# Patient Record
Sex: MALE | Race: OTHER | NOT HISPANIC OR LATINO | URBAN - METROPOLITAN AREA
[De-identification: names, ages, dates, MRNs, and addresses within clinical notes are randomized per-mention and may not be internally consistent; named-entity substitution may affect disease eponyms.]

---

## 2021-08-28 ENCOUNTER — TELEPHONE ENCOUNTER (OUTPATIENT)
Dept: URBAN - METROPOLITAN AREA CLINIC 13 | Facility: CLINIC | Age: 26
End: 2021-08-28

## 2021-08-29 ENCOUNTER — TELEPHONE ENCOUNTER (OUTPATIENT)
Dept: URBAN - METROPOLITAN AREA CLINIC 13 | Facility: CLINIC | Age: 26
End: 2021-08-29

## 2021-10-18 ENCOUNTER — OFFICE VISIT (OUTPATIENT)
Dept: URBAN - NONMETROPOLITAN AREA CLINIC 13 | Facility: CLINIC | Age: 26
End: 2021-10-18

## 2021-11-15 ENCOUNTER — OFFICE VISIT (OUTPATIENT)
Dept: URBAN - METROPOLITAN AREA CLINIC 54 | Facility: CLINIC | Age: 26
End: 2021-11-15
Payer: COMMERCIAL

## 2021-11-15 ENCOUNTER — WEB ENCOUNTER (OUTPATIENT)
Dept: URBAN - METROPOLITAN AREA CLINIC 54 | Facility: CLINIC | Age: 26
End: 2021-11-15

## 2021-11-15 VITALS
TEMPERATURE: 98.1 F | WEIGHT: 188 LBS | BODY MASS INDEX: 30.22 KG/M2 | HEART RATE: 89 BPM | SYSTOLIC BLOOD PRESSURE: 126 MMHG | HEIGHT: 66 IN | DIASTOLIC BLOOD PRESSURE: 82 MMHG

## 2021-11-15 DIAGNOSIS — R10.30 LOWER ABDOMINAL PAIN: ICD-10-CM

## 2021-11-15 DIAGNOSIS — B36.9 FUNGAL RASH OF TORSO: ICD-10-CM

## 2021-11-15 DIAGNOSIS — R19.5 MUCOUS IN STOOLS: ICD-10-CM

## 2021-11-15 PROCEDURE — 99204 OFFICE O/P NEW MOD 45 MIN: CPT | Performed by: INTERNAL MEDICINE

## 2021-11-15 RX ORDER — DICYCLOMINE HYDROCHLORIDE 20 MG/1
TAKE 1 TABLET BY MOUTH THREE TIMES DAILY AS NEEDED FOR 30 DAYS TABLET ORAL THREE TIMES A DAY
Qty: 270 | Refills: 3 | OUTPATIENT
Start: 2021-11-15 | End: 2022-11-10

## 2021-11-15 RX ORDER — KETOCONAZOLE FOAM 20 MG/G
1 APPLICATION AEROSOL, FOAM TOPICAL TWICE A DAY
Qty: 1 BOTTLE | Refills: 0 | OUTPATIENT

## 2021-11-15 NOTE — HPI-TODAY'S VISIT:
Patient reports that he has had rash in the past 2 months. Pt reports that there was a point in the week when it would calm down when it was almost gone. When scratches it it would flare up.  Pt rpeorts that he has had abdominal pain as well with tenderness. Pt reports that the abd pain is not worse with eating. Pt states that he has no change in bowel habits with it. Pt repors that for the past 5-6 months has noted  Had covid in Jan. Pt reports that he has had chest palpitations. Had CT scan which was negative  Pt reports that he is not sure if the abdominal pain is related to the rash. Did have a speck on the stool of whitish discoloration States had scrotal cyst and worried about ca.

## 2022-01-31 ENCOUNTER — OFFICE VISIT (OUTPATIENT)
Dept: URBAN - NONMETROPOLITAN AREA CLINIC 2 | Facility: CLINIC | Age: 27
End: 2022-01-31
Payer: COMMERCIAL

## 2022-01-31 VITALS
BODY MASS INDEX: 31.4 KG/M2 | SYSTOLIC BLOOD PRESSURE: 137 MMHG | HEIGHT: 66 IN | DIASTOLIC BLOOD PRESSURE: 74 MMHG | WEIGHT: 195.4 LBS | TEMPERATURE: 97.3 F | HEART RATE: 80 BPM

## 2022-01-31 DIAGNOSIS — R14.0 BLOATING: ICD-10-CM

## 2022-01-31 DIAGNOSIS — K58.8 OTHER IRRITABLE BOWEL SYNDROME: ICD-10-CM

## 2022-01-31 PROCEDURE — 99214 OFFICE O/P EST MOD 30 MIN: CPT | Performed by: NURSE PRACTITIONER

## 2022-01-31 RX ORDER — DICYCLOMINE HYDROCHLORIDE 20 MG/1
TAKE 1 TABLET BY MOUTH THREE TIMES DAILY AS NEEDED FOR 30 DAYS TABLET ORAL THREE TIMES A DAY
Qty: 270 | Refills: 3 | Status: ACTIVE | COMMUNITY
Start: 2021-11-15 | End: 2022-11-10

## 2022-01-31 RX ORDER — KETOCONAZOLE FOAM 20 MG/G
1 APPLICATION AEROSOL, FOAM TOPICAL TWICE A DAY
Qty: 1 BOTTLE | Refills: 0 | Status: ACTIVE | COMMUNITY

## 2022-01-31 RX ORDER — RIFAXIMIN 550 MG/1
1 TABLET TABLET ORAL THREE TIMES A DAY
Qty: 42 TABLET | Refills: 2 | OUTPATIENT
Start: 2022-01-31 | End: 2022-03-14

## 2022-01-31 NOTE — HPI-TODAY'S VISIT:
Patient reports that he has had rash in the past 2 months. Pt reports that there was a point in the week when it would calm down when it was almost gone. When scratches it it would flare up.  Pt rpeorts that he has had abdominal pain as well with tenderness. Pt reports that the abd pain is not worse with eating. Pt states that he has no change in bowel habits with it. Pt repors that for the past 5-6 months has noted. Had covid in Jan. Pt reports that he has had chest palpitations. Had CT scan which was negative. Pt reports that he is not sure if the abdominal pain is related to the rash. Did have a speck on the stool of whitish discoloration States had scrotal cyst and worried about ca.  1/31/2022 Andi presents for follow up of reflux and bloating.  A copy of this note be sent to the referring physician.  He states for the past year since marv Covid in January 2021 he has had postprandial bloating belching and bowel regularity.  He states that he has a soft bowel movement daily but has excessive gas.  He does have mucus in his stool at times.  He was unable to do the stool studies as ordered by Dr. Flood due to his work schedule with working nights at [employer].  He states he does have early satiety after most meals.  He denies any rectal bleeding or nocturnal symptoms.  He has had a contrasted CT scan done by his primary care physician, we do not have a copy of this.  Today his main complaint is abdominal pain and gas trapping.  MB

## 2022-01-31 NOTE — PHYSICAL EXAM EYES:
Conjuntivae and eyelids appear normal , Sclerae : White without injection Please fax x-ray orders to central scheduling

## 2022-02-02 LAB
A/G RATIO: 1.8
ALBUMIN: 4.6
ALKALINE PHOSPHATASE: 102
ALT (SGPT): 8
AST (SGOT): 13
BASO (ABSOLUTE): 0.1
BASOS: 1
BILIRUBIN, TOTAL: <0.2
BUN/CREATININE RATIO: 19
BUN: 18
C-REACTIVE PROTEIN, QUANT: 3
CALCIUM: 9.3
CARBON DIOXIDE, TOTAL: 17
CHLORIDE: 106
CREATININE: 0.94
DEAMIDATED GLIADIN ABS, IGA: 3
DEAMIDATED GLIADIN ABS, IGG: 6
EGFR IF AFRICN AM: 127
EGFR IF NONAFRICN AM: 110
ENDOMYSIAL ANTIBODY IGA: NEGATIVE
EOS (ABSOLUTE): 0.1
EOS: 2
GLOBULIN, TOTAL: 2.5
GLUCOSE: 91
H PYLORI, IGM ABS: <9
H. PYLORI, IGA ABS: <9
HEMATOCRIT: 48.9
HEMATOLOGY COMMENTS:: (no result)
HEMOGLOBIN: 16
IMMATURE CELLS: (no result)
IMMATURE GRANS (ABS): 0.1
IMMATURE GRANULOCYTES: 1
IMMUNOGLOBULIN A, QN, SERUM: 133
LYMPHS (ABSOLUTE): 2.3
LYMPHS: 30
MCH: 29.3
MCHC: 32.7
MCV: 90
MONOCYTES(ABSOLUTE): 0.7
MONOCYTES: 9
NEUTROPHILS (ABSOLUTE): 4.4
NEUTROPHILS: 57
NRBC: (no result)
PLATELETS: 286
POTASSIUM: 4.8
PROTEIN, TOTAL: 7.1
RBC: 5.46
RDW: 12.7
SEDIMENTATION RATE-WESTERGREN: 5
SODIUM: 140
T-TRANSGLUTAMINASE (TTG) IGA: <2
T-TRANSGLUTAMINASE (TTG) IGG: <2
T4,FREE(DIRECT): 0.94
TSH: 3.39
WBC: 7.7

## 2022-04-18 ENCOUNTER — LAB OUTSIDE AN ENCOUNTER (OUTPATIENT)
Dept: URBAN - NONMETROPOLITAN AREA CLINIC 2 | Facility: CLINIC | Age: 27
End: 2022-04-18

## 2022-04-18 ENCOUNTER — OFFICE VISIT (OUTPATIENT)
Dept: URBAN - NONMETROPOLITAN AREA CLINIC 2 | Facility: CLINIC | Age: 27
End: 2022-04-18
Payer: COMMERCIAL

## 2022-04-18 ENCOUNTER — DASHBOARD ENCOUNTERS (OUTPATIENT)
Age: 27
End: 2022-04-18

## 2022-04-18 VITALS
WEIGHT: 186 LBS | HEART RATE: 47 BPM | HEIGHT: 66 IN | BODY MASS INDEX: 29.89 KG/M2 | TEMPERATURE: 97.4 F | DIASTOLIC BLOOD PRESSURE: 71 MMHG | SYSTOLIC BLOOD PRESSURE: 122 MMHG

## 2022-04-18 DIAGNOSIS — R10.84 GENERALIZED ABDOMINAL PAIN: ICD-10-CM

## 2022-04-18 DIAGNOSIS — R19.8 ALTERNATING CONSTIPATION AND DIARRHEA: ICD-10-CM

## 2022-04-18 DIAGNOSIS — R19.5 MUCUS IN STOOL: ICD-10-CM

## 2022-04-18 DIAGNOSIS — R14.0 BLOATING: ICD-10-CM

## 2022-04-18 PROBLEM — 249517009: Status: ACTIVE | Noted: 2022-04-18

## 2022-04-18 PROBLEM — 116289008: Status: ACTIVE | Noted: 2022-01-31

## 2022-04-18 PROBLEM — 102614006: Status: ACTIVE | Noted: 2022-04-18

## 2022-04-18 PROCEDURE — 99214 OFFICE O/P EST MOD 30 MIN: CPT | Performed by: NURSE PRACTITIONER

## 2022-04-18 RX ORDER — HYOSCYAMINE SULFATE 0.12 MG/1
1 TABLET UNDER THE TONGUE AND ALLOW TO DISSOLVE  AS NEEDED TABLET SUBLINGUAL
Qty: 60 TABLET | Refills: 6 | OUTPATIENT
Start: 2022-04-18 | End: 2022-11-13

## 2022-04-18 RX ORDER — KETOCONAZOLE FOAM 20 MG/G
1 APPLICATION AEROSOL, FOAM TOPICAL TWICE A DAY
Qty: 1 BOTTLE | Refills: 0 | Status: ACTIVE | COMMUNITY

## 2022-04-18 RX ORDER — DICYCLOMINE HYDROCHLORIDE 20 MG/1
TAKE 1 TABLET BY MOUTH THREE TIMES DAILY AS NEEDED FOR 30 DAYS TABLET ORAL THREE TIMES A DAY
Qty: 270 | Refills: 3 | Status: ACTIVE | COMMUNITY
Start: 2021-11-15 | End: 2022-11-10

## 2022-04-18 NOTE — HPI-TODAY'S VISIT:
1/31/2022 Andi presents for follow up of reflux and bloating.  A copy of this note be sent to the referring physician.  He states for the past year since marv Covid in January 2021 he has had postprandial bloating belching and bowel regularity.  He states that he has a soft bowel movement daily but has excessive gas.  He does have mucus in his stool at times.  He was unable to do the stool studies as ordered by Dr. Flood due to his work schedule with working nights at [employer].  He states he does have early satiety after most meals.  He denies any rectal bleeding or nocturnal symptoms.  He has had a contrasted CT scan done by his primary care physician, we do not have a copy of this.  Today his main complaint is abdominal pain and gas trapping.  MB 4/18/2022 Andi presents for follow-up of abdominal bloating abdominal cramping and bowel regularity.  Since his last visit he completed the course of Xifaxan with no change.  Recently he has had worsening left lower quadrant abdominal pain and cramping with bowel regularity including large urgent bowel movements.  He is not taking anything for his symptoms.  Last week he was in severe pain, today he is tender to the touch.  He denies any nausea or vomiting but still has postprandial bloating.  He did have epididymitis this last year and also complains of pain in his groin.  Today he continues to complain of multiple GI complaints, he agrees to labs and imaging given his acute pain and colonoscopy if his imaging is normal.  He agrees to try Levsin as needed for spasm and cramping.  MB

## 2022-04-21 LAB
A/G RATIO: 1.7
ALBUMIN: 4.5
ALKALINE PHOSPHATASE: 100
ALT (SGPT): 7
AST (SGOT): 13
BASO (ABSOLUTE): 0
BASOS: 1
BILIRUBIN, TOTAL: 0.7
BUN/CREATININE RATIO: 11
BUN: 11
C-REACTIVE PROTEIN, QUANT: 4
CALCIUM: 9.7
CARBON DIOXIDE, TOTAL: 20
CHLORIDE: 103
CREATININE: 1.03
DEAMIDATED GLIADIN ABS, IGA: 5
DEAMIDATED GLIADIN ABS, IGG: 7
EGFR: 101
ENDOMYSIAL ANTIBODY IGA: NEGATIVE
EOS (ABSOLUTE): 0
EOS: 1
GLOBULIN, TOTAL: 2.7
GLUCOSE: 101
H PYLORI, IGM ABS: <9
H. PYLORI, IGA ABS: <9
HEMATOCRIT: 45
HEMATOLOGY COMMENTS:: (no result)
HEMOGLOBIN: 15.2
IMMATURE CELLS: (no result)
IMMATURE GRANS (ABS): 0
IMMATURE GRANULOCYTES: 1
IMMUNOGLOBULIN A, QN, SERUM: 126
LYMPHS (ABSOLUTE): 2.1
LYMPHS: 28
MCH: 29.4
MCHC: 33.8
MCV: 87
MONOCYTES(ABSOLUTE): 0.6
MONOCYTES: 8
NEUTROPHILS (ABSOLUTE): 4.8
NEUTROPHILS: 61
NRBC: (no result)
PLATELETS: 297
POTASSIUM: 5
PROTEIN, TOTAL: 7.2
RBC: 5.17
RDW: 12.9
SEDIMENTATION RATE-WESTERGREN: 2
SODIUM: 139
T-TRANSGLUTAMINASE (TTG) IGA: <2
T-TRANSGLUTAMINASE (TTG) IGG: <2
T4,FREE(DIRECT): 0.97
TSH: 1
WBC: 7.7

## 2022-05-04 ENCOUNTER — TELEPHONE ENCOUNTER (OUTPATIENT)
Dept: URBAN - NONMETROPOLITAN AREA CLINIC 13 | Facility: CLINIC | Age: 27
End: 2022-05-04

## 2022-05-05 ENCOUNTER — TELEPHONE ENCOUNTER (OUTPATIENT)
Dept: URBAN - METROPOLITAN AREA CLINIC 92 | Facility: CLINIC | Age: 27
End: 2022-05-05

## 2022-05-08 PROBLEM — 271864008: Status: ACTIVE | Noted: 2022-04-18

## 2022-05-18 ENCOUNTER — CLAIMS CREATED FROM THE CLAIM WINDOW (OUTPATIENT)
Dept: URBAN - NONMETROPOLITAN AREA SURGERY CENTER 1 | Facility: SURGERY CENTER | Age: 27
End: 2022-05-18
Payer: COMMERCIAL

## 2022-05-18 ENCOUNTER — CLAIMS CREATED FROM THE CLAIM WINDOW (OUTPATIENT)
Dept: URBAN - METROPOLITAN AREA CLINIC 4 | Facility: CLINIC | Age: 27
End: 2022-05-18
Payer: COMMERCIAL

## 2022-05-18 ENCOUNTER — OFFICE VISIT (OUTPATIENT)
Dept: URBAN - NONMETROPOLITAN AREA SURGERY CENTER 1 | Facility: SURGERY CENTER | Age: 27
End: 2022-05-18

## 2022-05-18 DIAGNOSIS — R10.84 ABDOMINAL CRAMPING, GENERALIZED: ICD-10-CM

## 2022-05-18 DIAGNOSIS — K63.89 CYST OF DUODENUM: ICD-10-CM

## 2022-05-18 DIAGNOSIS — R19.4 ALTERATION IN BOWEL ELIMINATION: ICD-10-CM

## 2022-05-18 PROCEDURE — G8907 PT DOC NO EVENTS ON DISCHARG: HCPCS | Performed by: INTERNAL MEDICINE

## 2022-05-18 PROCEDURE — 88342 IMHCHEM/IMCYTCHM 1ST ANTB: CPT | Performed by: PATHOLOGY

## 2022-05-18 PROCEDURE — 88313 SPECIAL STAINS GROUP 2: CPT | Performed by: PATHOLOGY

## 2022-05-18 PROCEDURE — 88305 TISSUE EXAM BY PATHOLOGIST: CPT | Performed by: PATHOLOGY

## 2022-05-18 PROCEDURE — 45380 COLONOSCOPY AND BIOPSY: CPT | Performed by: INTERNAL MEDICINE

## 2022-05-31 ENCOUNTER — TELEPHONE ENCOUNTER (OUTPATIENT)
Dept: URBAN - NONMETROPOLITAN AREA CLINIC 13 | Facility: CLINIC | Age: 27
End: 2022-05-31

## 2022-07-11 ENCOUNTER — OFFICE VISIT (OUTPATIENT)
Dept: URBAN - NONMETROPOLITAN AREA CLINIC 2 | Facility: CLINIC | Age: 27
End: 2022-07-11

## 2022-07-18 ENCOUNTER — OFFICE VISIT (OUTPATIENT)
Dept: URBAN - NONMETROPOLITAN AREA CLINIC 2 | Facility: CLINIC | Age: 27
End: 2022-07-18

## 2022-09-06 NOTE — PHYSICAL EXAM NEUROLOGIC:
Adderall 20 MG  Last Ordered: 7/28/22 with 60# and 0 refills  LOV: 4/29/22   oriented to person, place and time , normal sensation , short and long term memory intact

## 2024-09-17 ENCOUNTER — TELEPHONE ENCOUNTER (OUTPATIENT)
Dept: URBAN - NONMETROPOLITAN AREA CLINIC 13 | Facility: CLINIC | Age: 29
End: 2024-09-17

## 2024-10-17 ENCOUNTER — OFFICE VISIT (OUTPATIENT)
Dept: URBAN - NONMETROPOLITAN AREA CLINIC 2 | Facility: CLINIC | Age: 29
End: 2024-10-17
Payer: COMMERCIAL

## 2024-10-17 ENCOUNTER — LAB OUTSIDE AN ENCOUNTER (OUTPATIENT)
Dept: URBAN - NONMETROPOLITAN AREA CLINIC 2 | Facility: CLINIC | Age: 29
End: 2024-10-17

## 2024-10-17 VITALS
DIASTOLIC BLOOD PRESSURE: 86 MMHG | HEIGHT: 66 IN | HEART RATE: 96 BPM | SYSTOLIC BLOOD PRESSURE: 128 MMHG | WEIGHT: 203 LBS | BODY MASS INDEX: 32.62 KG/M2

## 2024-10-17 DIAGNOSIS — R10.9 RIGHT FLANK PAIN: ICD-10-CM

## 2024-10-17 DIAGNOSIS — Z98.890 HISTORY OF COLONOSCOPY: ICD-10-CM

## 2024-10-17 DIAGNOSIS — R10.11 RUQ ABDOMINAL PAIN: ICD-10-CM

## 2024-10-17 DIAGNOSIS — R19.8 ALTERNATING CONSTIPATION AND DIARRHEA: ICD-10-CM

## 2024-10-17 DIAGNOSIS — K76.0 FATTY LIVER: ICD-10-CM

## 2024-10-17 PROBLEM — 197321007: Status: ACTIVE | Noted: 2024-10-17

## 2024-10-17 PROBLEM — 301717006: Status: ACTIVE | Noted: 2024-10-17

## 2024-10-17 PROBLEM — 162050009: Status: ACTIVE | Noted: 2024-10-17

## 2024-10-17 PROBLEM — 851000119109: Status: ACTIVE | Noted: 2024-10-17

## 2024-10-17 PROCEDURE — 99214 OFFICE O/P EST MOD 30 MIN: CPT | Performed by: NURSE PRACTITIONER

## 2024-10-17 RX ORDER — KETOCONAZOLE FOAM 20 MG/G
1 APPLICATION AEROSOL, FOAM TOPICAL TWICE A DAY
Qty: 1 BOTTLE | Refills: 0 | Status: ACTIVE | COMMUNITY

## 2024-10-17 RX ORDER — PANTOPRAZOLE SODIUM 20 MG/1
1 TABLET TABLET, DELAYED RELEASE ORAL ONCE A DAY
Qty: 90 TABLET | Refills: 3 | OUTPATIENT
Start: 2024-10-17

## 2024-10-17 NOTE — HPI-TODAY'S VISIT:
1/31/2022 Andi presents for follow up of reflux and bloating.  A copy of this note be sent to the referring physician.  He states for the past year since marv Covid in January 2021 he has had postprandial bloating belching and bowel regularity.  He states that he has a soft bowel movement daily but has excessive gas.  He does have mucus in his stool at times.  He was unable to do the stool studies as ordered by Dr. Flood due to his work schedule with working nights at [employer].  He states he does have early satiety after most meals.  He denies any rectal bleeding or nocturnal symptoms.  He has had a contrasted CT scan done by his primary care physician, we do not have a copy of this.  Today his main complaint is abdominal pain and gas trapping.  MB 4/18/2022 Andi presents for follow-up of abdominal bloating abdominal cramping and bowel regularity.  Since his last visit he completed the course of Xifaxan with no change.  Recently he has had worsening left lower quadrant abdominal pain and cramping with bowel regularity including large urgent bowel movements.  He is not taking anything for his symptoms.  Last week he was in severe pain, today he is tender to the touch.  He denies any nausea or vomiting but still has postprandial bloating.  He did have epididymitis this last year and also complains of pain in his groin.  Today he continues to complain of multiple GI complaints, he agrees to labs and imaging given his acute pain and colonoscopy if his imaging is normal.  He agrees to try Levsin as needed for spasm and cramping.  MB 10/17/2024 Andi presents for evaluation of right flank pain.  Recently went to the emergency room with acute right flank pain with severe episodes.  He had a noncontrasted CT scan that was normal other than fatty liver.  He did not have significant hepatomegaly.  He was told this may be due to his fatty liver.  The pain is postprandial starts in his right upper quadrant and then radiates to his back.  He does have his gallbladder.  He does take NSAIDs occasionally.  Today we will start pantoprazole 20 mg daily, check ultrasound the gallbladder, and consider EGD at follow-up if no relief.  MB

## 2025-01-08 ENCOUNTER — OFFICE VISIT (OUTPATIENT)
Dept: URBAN - NONMETROPOLITAN AREA CLINIC 13 | Facility: CLINIC | Age: 30
End: 2025-01-08

## 2025-02-05 ENCOUNTER — OFFICE VISIT (OUTPATIENT)
Dept: URBAN - METROPOLITAN AREA TELEHEALTH 2 | Facility: TELEHEALTH | Age: 30
End: 2025-02-05